# Patient Record
Sex: MALE | Race: BLACK OR AFRICAN AMERICAN | RURAL
[De-identification: names, ages, dates, MRNs, and addresses within clinical notes are randomized per-mention and may not be internally consistent; named-entity substitution may affect disease eponyms.]

---

## 2019-07-09 NOTE — PATIENT DISCUSSION
SYMFONY IOL INTOLERANCE OU - OFFERED IOL EXCHANGE VS. YAG LASER CAPSULOTOMY FOR PCO OR LIVING WITH THE COMPROMISES THAT COME WITH A MULTIFOCAL IOL ie. RINGS AND HALOS AROUND LIGHTS AT NIGHT .

## 2019-07-09 NOTE — PATIENT DISCUSSION
THE PATIENT IS NOT ADAPTING WELL TO SYMFONY MULTI-FOCAL IOL'S. IT WAS EXPLAINED TO THE PATIENT THAT IF SHE HAS THE IOL REPLACED WITH A  SINGLE VISION DISTANCE IOL INTO BOTH EYES THAT SHE  WILL BE RELIANT ON GLASSES FOR ALL NEAR AND INTERMEDIATE ACTIVITIES.

## 2019-07-09 NOTE — PATIENT DISCUSSION
HOLD OFF ON SURGICAL INTERVENTION TO THE RIGHT EYE FOR NOW, WE WILL RE-EVALUATE THIS ONCE THE LEFT EYE IS STABLE.

## 2019-07-09 NOTE — PATIENT DISCUSSION
QUESTIONABLE VISUAL FIELD DEFECT OD - REFER TO DR. KERRISON FOR A FULL NEURO-OPHTHALMIC EVALUATION AND CLEARANCE PRIOR TO AN IOL EXCHANGE.

## 2019-08-23 NOTE — PATIENT DISCUSSION
RISKS AND BENEFITS OF THE IOL EXCHANGE HAVE BEEN DISCUSSED WITH THE PATIENT INCLUDING A DELAY IN RECOVERY OF UP TO 12 WEEKS OR POTENTIAL RETINA PROBLEMS OR EVEN BLINDNESS.

## 2019-08-23 NOTE — PATIENT DISCUSSION
PATIENT UNDERSTANDS OPTIONS/RISKS OF IOL EXCHANGE AND WISHES TO PROCEED WITH SURGERY IN THE RIGHT EYE LIKE SHE HAS DONE WITH THE LEFT EYE. PATIENT UNDERSTANDS THAT SHE WILL NEED GLASSES FOR NEAR, INTERMEDIATE, AND DISTANCE VISION.

## 2019-08-23 NOTE — PATIENT DISCUSSION
OFFERED IOL EXCHANGE VS. YAG LASER CAPSULOTOMY FOR PCO OR LIVING WITH THE COMPROMISES THAT COME WITH A MULTIFOCAL IOL ie. RINGS AND HALOS AROUND LIGHTS AT NIGHT .

## 2019-08-23 NOTE — PATIENT DISCUSSION
IOL INTOLERANCE, OD - PATIENT NOT TOLERATING VISION WITH SYMFONY LENS. PATIENT COMPLAINS OF INCREASED GLARE AND HALO SYMPTOMS. PATIENT STATES UNSATISFACTORY VISION WITH TRIAL FRAME. SHE STATES THAT VISION IS NOT CLEAR, AND NOT IN FOCUS.

## 2019-10-03 NOTE — PATIENT DISCUSSION
S/P IOL EXCHANGE OD- DOING WELL. CONTINUE DROPS AS DIRECTED. ADVISED PATIENT DRYNESS IS NORMAL AFTER SURGERY. RECOMMEND USING ARTIFICIAL TEARS BID-QID. FOLLOW.

## 2019-10-03 NOTE — PATIENT DISCUSSION
***This patient had an IOL Exchange performed. A monofocal IOL was placed to achieve a target refraction of plano (which should provide them with satisfactory distance vision with the aid of glasses/contact lenses). ***

## 2019-11-19 NOTE — PATIENT DISCUSSION
POSTERIOR CAPSULAR FIBROSIS/OPACIFICATION, OU - VISUALLY SIGNIFICANT. SCHEDULE YAG CAPSULOTOMY OS THEN OD IF VISUAL SYMPTOMS PERSIST.

## 2020-01-09 NOTE — PATIENT DISCUSSION
DISCUSSED DRY EYE, CONTINUE WITH RESTASIS RECOMMEND CONSISTENT USE AND SUPPLEMENTING WITH ARTIFICIAL TEARS THROUGHOUT THE DAY

## 2021-07-23 NOTE — PATIENT DISCUSSION
DEANA/K SICCA, OU  - PRESCRIBED ARTIFICIAL TEARS BID - QID, OU AND THE DAILY INTAKE OF OMEGA 3/FATTY ACIDS. RX RESTASIS BID OU. CONSIDER OTHER TREATMENT OPTIONS IF SYMPTOMS PERSIST/WORSEN. FOLLOW.

## 2022-06-29 ENCOUNTER — NEW PATIENT (OUTPATIENT)
Dept: RURAL CLINIC 3 | Facility: CLINIC | Age: 47
End: 2022-06-29

## 2022-06-29 DIAGNOSIS — H52.4: ICD-10-CM

## 2022-06-29 PROCEDURE — 92015 DETERMINE REFRACTIVE STATE: CPT

## 2022-06-29 PROCEDURE — 92004 COMPRE OPH EXAM NEW PT 1/>: CPT

## 2022-06-29 ASSESSMENT — TONOMETRY
OS_IOP_MMHG: 21
OD_IOP_MMHG: 21

## 2022-06-29 ASSESSMENT — VISUAL ACUITY
OD_SC: 20/25-2
OS_SC: 20/40
OS_PH: 20/20-2

## 2023-10-13 NOTE — PATIENT DISCUSSION
PATIENT STATES SHE IS BOTHERED BY HER QUALITY OF VISION AND IS BOTHERED BY GLARE Spray Paint Technique: No Include Z78.9 (Other Specified Conditions Influencing Health Status) As An Associated Diagnosis?: Yes Spray Paint Text: The liquid nitrogen was applied to the skin utilizing a spray paint frosting technique. Medical Necessity Clause: This procedure was medically necessary because the lesions that were treated were: at risk for and/or subject to recurrent physical trauma as a result of lesion type and location with history of the same, bleeding and irritated. Post-Care Instructions: I reviewed with the patient in detail post-care instructions. Patient is to wear sunprotection, and avoid picking at any of the treated lesions. Pt may apply Vaseline to crusted or scabbing areas. Medical Necessity Information: It is in your best interest to select a reason for this procedure from the list below. All of these items fulfill various CMS LCD requirements except the new and changing color options. Number Of Freeze-Thaw Cycles: 2 freeze-thaw cycles Consent: The patient's consent was obtained including but not limited to risks of crusting, scabbing, blistering, scarring, darker or lighter pigmentary change, recurrence, incomplete removal and infection. Detail Level: Zone Duration Of Freeze Thaw-Cycle (Seconds): 2